# Patient Record
Sex: MALE | Race: WHITE | URBAN - METROPOLITAN AREA
[De-identification: names, ages, dates, MRNs, and addresses within clinical notes are randomized per-mention and may not be internally consistent; named-entity substitution may affect disease eponyms.]

---

## 2017-09-11 ENCOUNTER — HOSPITAL ENCOUNTER (EMERGENCY)
Facility: CLINIC | Age: 22
Discharge: HOME | End: 2017-09-11
Attending: EMERGENCY MEDICINE | Admitting: EMERGENCY MEDICINE
Payer: OTHER MISCELLANEOUS

## 2017-09-11 VITALS
DIASTOLIC BLOOD PRESSURE: 84 MMHG | WEIGHT: 170 LBS | RESPIRATION RATE: 16 BRPM | HEART RATE: 70 BPM | TEMPERATURE: 98.2 F | SYSTOLIC BLOOD PRESSURE: 121 MMHG | OXYGEN SATURATION: 98 %

## 2017-09-11 DIAGNOSIS — S01.01XA LACERATION OF SCALP, INITIAL ENCOUNTER: ICD-10-CM

## 2017-09-11 PROCEDURE — 99282 EMERGENCY DEPT VISIT SF MDM: CPT | Mod: 25

## 2017-09-11 PROCEDURE — 90471 IMMUNIZATION ADMIN: CPT

## 2017-09-11 PROCEDURE — 25000128 H RX IP 250 OP 636: Performed by: EMERGENCY MEDICINE

## 2017-09-11 PROCEDURE — 90715 TDAP VACCINE 7 YRS/> IM: CPT | Performed by: EMERGENCY MEDICINE

## 2017-09-11 RX ADMIN — CLOSTRIDIUM TETANI TOXOID ANTIGEN (FORMALDEHYDE INACTIVATED), CORYNEBACTERIUM DIPHTHERIAE TOXOID ANTIGEN (FORMALDEHYDE INACTIVATED), BORDETELLA PERTUSSIS TOXOID ANTIGEN (GLUTARALDEHYDE INACTIVATED), BORDETELLA PERTUSSIS FILAMENTOUS HEMAGGLUTININ ANTIGEN (FORMALDEHYDE INACTIVATED), BORDETELLA PERTUSSIS PERTACTIN ANTIGEN, AND BORDETELLA PERTUSSIS FIMBRIAE 2/3 ANTIGEN 0.5 ML: 5; 2; 2.5; 5; 3; 5 INJECTION, SUSPENSION INTRAMUSCULAR at 12:40

## 2017-09-11 NOTE — ED AVS SNAPSHOT
Emergency Department    [address] [facility] [address]    Phone:  [phone]    Fax:  [phone]                                       George Tucker   MRN: [id number]    Department:   Emergency Department   Date of Visit:  9/11/2017           Patient Information     Date Of Birth          1995        Your diagnoses for this visit were:     Laceration of scalp, initial encounter        You were seen by Isaura Cruz MD.      Follow-up Information     Follow up with  Emergency Department.    Specialty:  EMERGENCY MEDICINE    Why:  As needed    Contact information:    [address] [facility] [address] [phone]        Discharge Instructions         Small or Superficial Laceration: Not Sutured  A laceration is a cut through the skin. A laceration requires stitches or staples if it is deep or spread open. A small laceration often doesn't require stitches.   You may need a tetanus shot. This may be given if you have no record of this vaccination and the object that caused the cut may lead to tetanus  Home care    Your healthcare provider may prescribe an antibiotic. This is to help prevent infection. Follow all instructions for taking this medicine. Take the medicine every day until it is gone or you are told to stop. You should not have any left over.    The healthcare provider may prescribe medicines for pain. Follow instructions for taking them.    Follow the healthcare provider s instructions on how to care for the cut.    Wash your hands with soap and warm water before and after caring for cut. This helps prevent infection.    Keep the wound clean and dry. If a bandage was applied and it becomes wet or dirty, replace it. Otherwise, leave it in place for the first 24 hours, then change it once a day or as directed.    Clean the wound daily:    After removing any bandage, wash the area with soap and water. Use a wet cotton swab to loosen and  remove any blood or crust that forms.    After cleaning, keep the wound clean and dry. Talk with your doctor before applying any antibiotic ointment to the wound. Reapply a fresh bandage.    You may remove the bandage to shower as usual after the first 24 hours, but do not soak the area in water (no tub baths or swimming) for the next 5 days.    If the area gets wet, gently pat it dry with a clean cloth. Replace the wet bandage with a dry one.    Avoid activities that may reinjure your wound.    Do not scratch, rub, or pick at the area.    Check the wound daily for signs of infection listed below.  Follow-up care  Follow up with your healthcare provider as advised.  When to seek medical advice  Call your healthcare provider right away if any of these occur:    Wound bleeding not controlled by direct pressure    Signs of infection, including increasing pain in the wound, increasing wound redness or swelling, or pus or bad odor coming from the wound    Fever of 100.4 F (38 C) or higher or as directed by your healthcare provider    Stitches or staples come apart or fall out or surgical tape falls off before 7 days    Wound edges re-open    Wound changes colors    Numbness around the wound     Decreased movement around the injured area  Date Last Reviewed: 6/14/2015 2000-2017 [employer]. [address] [place] [address], [place] [address]. All rights reserved. This information is not intended as a substitute for professional medical care. Always follow your healthcare professional's instructions.          24 Hour Appointment Hotline       To make an appointment at any [facility], call [phone] ([phone]). If you don't have a family doctor or clinic, we will help you find one. [facility] are conveniently located to serve the needs of you and your family.             Review of your medicines      Notice     You have not been prescribed any medications.            Orders Needing Specimen  Collection     None      Pending Results     No orders found from 9/9/2017 to 9/12/2017.            Pending Culture Results     No orders found from 9/9/2017 to 9/12/2017.            Pending Results Instructions     If you had any lab results that were not finalized at the time of your Discharge, you can call the ED Lab Result RN at [phone]. You will be contacted by this team for any positive Lab results or changes in treatment. The nurses are available 7 days a week from 10A to 6:30P.  You can leave a message 24 hours per day and they will return your call.        Test Results From Your Hospital Stay               Clinical Quality Measure: Blood Pressure Screening     Your blood pressure was checked while you were in the emergency department today. The last reading we obtained was  BP: 121/84 (Simultaneous filing. User may not have seen previous data.) . Please read the guidelines below about what these numbers mean and what you should do about them.  If your systolic blood pressure (the top number) is less than 120 and your diastolic blood pressure (the bottom number) is less than 80, then your blood pressure is normal. There is nothing more that you need to do about it.  If your systolic blood pressure (the top number) is 120-139 or your diastolic blood pressure (the bottom number) is 80-89, your blood pressure may be higher than it should be. You should have your blood pressure rechecked within a year by a primary care provider.  If your systolic blood pressure (the top number) is 140 or greater or your diastolic blood pressure (the bottom number) is 90 or greater, you may have high blood pressure. High blood pressure is treatable, but if left untreated over time it can put you at risk for heart attack, stroke, or kidney failure. You should have your blood pressure rechecked by a primary care provider within the next 4 weeks.  If your provider in the emergency department today gave you specific instructions  "to follow-up with your doctor or provider even sooner than that, you should follow that instruction and not wait for up to 4 weeks for your follow-up visit.        Thank you for choosing [facility]       Thank you for choosing [facility] for your care. Our goal is always to provide you with excellent care. Hearing back from our patients is one way we can continue to improve our services. Please take a few minutes to complete the written survey that you may receive in the mail after you visit with us. Thank you!        [employer] Information     [employer] lets you send messages to your doctor, view your test results, renew your prescriptions, schedule appointments and more. To sign up, go to [email or web address] . Click on \"Log in\" on the left side of the screen, which will take you to the Welcome page. Then click on \"Sign up Now\" on the right side of the page.     You will be asked to enter the access code listed below, as well as some personal information. Please follow the directions to create your username and password.     Your access code is: [email or web address]  Expires: 12/10/2017 12:43 PM     Your access code will  in 90 days. If you need help or a new code, please call your [facility] clinic or [phone].        Care EveryWhere ID     This is your Care EveryWhere ID. This could be used by other organizations to access your [facility] medical records  [id number]        Equal Access to Services     [employer] AH: Hadii marleen Nick, waaxda ilda, qaybta kaalmaamy goyal, maye jacobson . So [facility] [phone].    ATENCIÓN: Si habla español, tiene a llanes disposición servicios gratuitos de asistencia lingüística. Hiro al [phone].    We comply with applicable federal civil rights laws and [place] laws. We do not discriminate on the basis of race, color, national origin, age, disability sex, sexual orientation or gender identity.            After Visit Summary       This " is your record. Keep this with you and show to your community pharmacist(s) and doctor(s) at your next visit.

## 2017-09-11 NOTE — ED AVS SNAPSHOT
Emergency Department    [address] [place] [address]    Phone:  [phone]    Fax:  [phone]                                       George Tucker   MRN: [id number]    Department:   Emergency Department   Date of Visit:  9/11/2017           After Visit Summary Signature Page     I have received my discharge instructions, and my questions have been answered. I have discussed any challenges I see with this plan with the nurse or doctor.    ..........................................................................................................................................  Patient/Patient Representative Signature      ..........................................................................................................................................  Patient Representative Print Name and Relationship to Patient    ..................................................               ................................................  Date                                            Time    ..........................................................................................................................................  Reviewed by Signature/Title    ...................................................              ..............................................  Date                                                            Time

## 2017-09-11 NOTE — DISCHARGE INSTRUCTIONS
Small or Superficial Laceration: Not Sutured  A laceration is a cut through the skin. A laceration requires stitches or staples if it is deep or spread open. A small laceration often doesn't require stitches.   You may need a tetanus shot. This may be given if you have no record of this vaccination and the object that caused the cut may lead to tetanus  Home care    Your healthcare provider may prescribe an antibiotic. This is to help prevent infection. Follow all instructions for taking this medicine. Take the medicine every day until it is gone or you are told to stop. You should not have any left over.    The healthcare provider may prescribe medicines for pain. Follow instructions for taking them.    Follow the healthcare provider s instructions on how to care for the cut.    Wash your hands with soap and warm water before and after caring for cut. This helps prevent infection.    Keep the wound clean and dry. If a bandage was applied and it becomes wet or dirty, replace it. Otherwise, leave it in place for the first 24 hours, then change it once a day or as directed.    Clean the wound daily:    After removing any bandage, wash the area with soap and water. Use a wet cotton swab to loosen and remove any blood or crust that forms.    After cleaning, keep the wound clean and dry. Talk with your doctor before applying any antibiotic ointment to the wound. Reapply a fresh bandage.    You may remove the bandage to shower as usual after the first 24 hours, but do not soak the area in water (no tub baths or swimming) for the next 5 days.    If the area gets wet, gently pat it dry with a clean cloth. Replace the wet bandage with a dry one.    Avoid activities that may reinjure your wound.    Do not scratch, rub, or pick at the area.    Check the wound daily for signs of infection listed below.  Follow-up care  Follow up with your healthcare provider as advised.  When to seek medical advice  Call your healthcare  provider right away if any of these occur:    Wound bleeding not controlled by direct pressure    Signs of infection, including increasing pain in the wound, increasing wound redness or swelling, or pus or bad odor coming from the wound    Fever of 100.4 F (38 C) or higher or as directed by your healthcare provider    Stitches or staples come apart or fall out or surgical tape falls off before 7 days    Wound edges re-open    Wound changes colors    Numbness around the wound     Decreased movement around the injured area  Date Last Reviewed: 6/14/2015 2000-2017 [employer]. [address] [place] [address]. All rights reserved. This information is not intended as a substitute for professional medical care. Always follow your healthcare professional's instructions.

## 2017-09-11 NOTE — ED PROVIDER NOTES
"  History     Chief Complaint:  Head Laceration     HPI   George Tucker is a 22 year old male who presents with a head laceration. He states he set up at work and hit the top of his head. He did not lose consciousness. He temporarily felt \"woozy\" when he saw the blood but otherwise denies lightheadedness or dizziness. There is no focal neurologic deficit. He does have some upper neck discomfort when he flexes and extends his neck. He has no pain when he rotates his neck side to side. The pain is primarily paraspinal and not exacerbated by midline palpation.    Allergies:  No known drug allergies     Medications:    The patient is currently on no regular medications.    Past Medical History:    Migraine    Past Surgical History:    History reviewed. No pertinent surgical history.    Family History:    History reviewed. No pertinent family history.     Social History:  Smoking status: Never smoker  Marital Status:  Single [1]     Review of Systems  +head lac    Physical Exam     Patient Vitals for the past 24 hrs:   BP Temp Temp src Pulse Heart Rate Resp SpO2 Weight   09/11/17 1218 121/84 98.2  F (36.8  C) Oral 70 70 16 98 % 77.1 kg (170 lb)     Physical Exam  General/Appearance: appears stated age, well-groomed, appears comfortable  Head: 1cm superficial laceration to superior scalp  Eyes: EOMI, no scleral injection, no icterus  ENT: MMM  Neck: supple, nl ROM, no stiffness, very mild superior paraspinous discomfort to palp, no midline ttp/bony step-offs/crepitance  Neuro: GCS 15, alert and oriented, no gross focal neuro deficits  Psych: interacts appropriately  Heme: no petechia, no purpura, no active bleeding        Emergency Department Course     Emergency Department Course:  Past medical records, nursing notes, and vitals reviewed.  1230: I performed an exam of the patient and obtained history, as documented above.     Impression & Plan      Medical Decision Making:  This patient is a 22-year-old male who " presents with small superficial laceration to the top of the scalp. No procedure is needed given the superficial nature. He is receiving a tetanus here. He has no other neurologic abnormalities. He had minor discomfort to paraspinous palpation however given the mechanism have low suspicion for any cord/bony pathology. Patient feels comfortable with discharge.  Diagnosis:    ICD-10-CM    1. Laceration of scalp, initial encounter S01.01XA        Disposition: home    Discharge Medications:  New Prescriptions    No medications on file     Leti Anthony  9/11/2017    EMERGENCY DEPARTMENT    Leti OLSON am serving as a scribe at 12:27 PM on 9/11/2017 to document services personally performed by Isaura Cruz MD based on my observations and the provider's statements to me.        Isaura Cruz MD  09/11/17 9028